# Patient Record
Sex: MALE | Race: WHITE | NOT HISPANIC OR LATINO | URBAN - METROPOLITAN AREA
[De-identification: names, ages, dates, MRNs, and addresses within clinical notes are randomized per-mention and may not be internally consistent; named-entity substitution may affect disease eponyms.]

---

## 2017-05-06 ENCOUNTER — OUTPATIENT (OUTPATIENT)
Dept: OUTPATIENT SERVICES | Facility: HOSPITAL | Age: 47
LOS: 1 days | End: 2017-05-06

## 2017-08-12 ENCOUNTER — OUTPATIENT (OUTPATIENT)
Dept: OUTPATIENT SERVICES | Facility: HOSPITAL | Age: 47
LOS: 1 days | End: 2017-08-12

## 2017-09-09 ENCOUNTER — OUTPATIENT (OUTPATIENT)
Dept: OUTPATIENT SERVICES | Facility: HOSPITAL | Age: 47
LOS: 1 days | End: 2017-09-09

## 2017-10-05 PROBLEM — Z00.00 ENCOUNTER FOR PREVENTIVE HEALTH EXAMINATION: Status: ACTIVE | Noted: 2017-10-05

## 2017-10-23 ENCOUNTER — APPOINTMENT (OUTPATIENT)
Dept: OPHTHALMOLOGY | Facility: CLINIC | Age: 47
End: 2017-10-23
Payer: COMMERCIAL

## 2017-10-23 DIAGNOSIS — H25.13 AGE-RELATED NUCLEAR CATARACT, BILATERAL: ICD-10-CM

## 2017-10-23 PROCEDURE — 92004 COMPRE OPH EXAM NEW PT 1/>: CPT

## 2019-07-28 ENCOUNTER — INPATIENT (INPATIENT)
Facility: HOSPITAL | Age: 49
LOS: 0 days | Discharge: HOME | DRG: 312 | End: 2019-07-29
Attending: HOSPITALIST | Admitting: HOSPITALIST
Payer: COMMERCIAL

## 2019-07-28 VITALS
OXYGEN SATURATION: 98 % | HEART RATE: 73 BPM | RESPIRATION RATE: 22 BRPM | WEIGHT: 160.06 LBS | SYSTOLIC BLOOD PRESSURE: 178 MMHG | TEMPERATURE: 98 F | HEIGHT: 66 IN | DIASTOLIC BLOOD PRESSURE: 60 MMHG

## 2019-07-28 DIAGNOSIS — R55 SYNCOPE AND COLLAPSE: ICD-10-CM

## 2019-07-28 LAB
ALBUMIN SERPL ELPH-MCNC: 4.3 G/DL — SIGNIFICANT CHANGE UP (ref 3.3–5.2)
ALP SERPL-CCNC: 60 U/L — SIGNIFICANT CHANGE UP (ref 40–120)
ALT FLD-CCNC: 8 U/L — SIGNIFICANT CHANGE UP
ANION GAP SERPL CALC-SCNC: 10 MMOL/L — SIGNIFICANT CHANGE UP (ref 5–17)
APTT BLD: 24.9 SEC — LOW (ref 27.5–36.3)
AST SERPL-CCNC: 19 U/L — SIGNIFICANT CHANGE UP
BILIRUB SERPL-MCNC: 0.4 MG/DL — SIGNIFICANT CHANGE UP (ref 0.4–2)
BLD GP AB SCN SERPL QL: SIGNIFICANT CHANGE UP
BUN SERPL-MCNC: 14 MG/DL — SIGNIFICANT CHANGE UP (ref 8–20)
CALCIUM SERPL-MCNC: 8.4 MG/DL — LOW (ref 8.6–10.2)
CHLORIDE SERPL-SCNC: 108 MMOL/L — HIGH (ref 98–107)
CO2 SERPL-SCNC: 23 MMOL/L — SIGNIFICANT CHANGE UP (ref 22–29)
CREAT SERPL-MCNC: 0.74 MG/DL — SIGNIFICANT CHANGE UP (ref 0.5–1.3)
GLUCOSE BLDC GLUCOMTR-MCNC: 158 MG/DL — HIGH (ref 70–99)
GLUCOSE BLDC GLUCOMTR-MCNC: 167 MG/DL — HIGH (ref 70–99)
GLUCOSE BLDC GLUCOMTR-MCNC: 190 MG/DL — HIGH (ref 70–99)
GLUCOSE SERPL-MCNC: 177 MG/DL — HIGH (ref 70–115)
HCT VFR BLD CALC: 46.1 % — SIGNIFICANT CHANGE UP (ref 39–50)
HGB BLD-MCNC: 15.7 G/DL — SIGNIFICANT CHANGE UP (ref 13–17)
INR BLD: 0.91 RATIO — SIGNIFICANT CHANGE UP (ref 0.88–1.16)
MCHC RBC-ENTMCNC: 31.8 PG — SIGNIFICANT CHANGE UP (ref 27–34)
MCHC RBC-ENTMCNC: 34.1 GM/DL — SIGNIFICANT CHANGE UP (ref 32–36)
MCV RBC AUTO: 93.5 FL — SIGNIFICANT CHANGE UP (ref 80–100)
PLATELET # BLD AUTO: 216 K/UL — SIGNIFICANT CHANGE UP (ref 150–400)
POTASSIUM SERPL-MCNC: 4.4 MMOL/L — SIGNIFICANT CHANGE UP (ref 3.5–5.3)
POTASSIUM SERPL-SCNC: 4.4 MMOL/L — SIGNIFICANT CHANGE UP (ref 3.5–5.3)
PROT SERPL-MCNC: 6.1 G/DL — LOW (ref 6.6–8.7)
PROTHROM AB SERPL-ACNC: 10.4 SEC — SIGNIFICANT CHANGE UP (ref 10–12.9)
RBC # BLD: 4.93 M/UL — SIGNIFICANT CHANGE UP (ref 4.2–5.8)
RBC # FLD: 12.5 % — SIGNIFICANT CHANGE UP (ref 10.3–14.5)
SODIUM SERPL-SCNC: 141 MMOL/L — SIGNIFICANT CHANGE UP (ref 135–145)
TROPONIN T SERPL-MCNC: <0.01 NG/ML — SIGNIFICANT CHANGE UP (ref 0–0.06)
TSH SERPL-MCNC: 1.06 UIU/ML — SIGNIFICANT CHANGE UP (ref 0.27–4.2)
WBC # BLD: 9.2 K/UL — SIGNIFICANT CHANGE UP (ref 3.8–10.5)
WBC # FLD AUTO: 9.2 K/UL — SIGNIFICANT CHANGE UP (ref 3.8–10.5)

## 2019-07-28 PROCEDURE — 99285 EMERGENCY DEPT VISIT HI MDM: CPT

## 2019-07-28 PROCEDURE — 99223 1ST HOSP IP/OBS HIGH 75: CPT

## 2019-07-28 PROCEDURE — 93880 EXTRACRANIAL BILAT STUDY: CPT | Mod: 26

## 2019-07-28 PROCEDURE — 93010 ELECTROCARDIOGRAM REPORT: CPT

## 2019-07-28 PROCEDURE — 70450 CT HEAD/BRAIN W/O DYE: CPT | Mod: 26

## 2019-07-28 PROCEDURE — 71046 X-RAY EXAM CHEST 2 VIEWS: CPT | Mod: 26

## 2019-07-28 PROCEDURE — 99222 1ST HOSP IP/OBS MODERATE 55: CPT

## 2019-07-28 RX ORDER — DEXTROSE 50 % IN WATER 50 %
25 SYRINGE (ML) INTRAVENOUS ONCE
Refills: 0 | Status: DISCONTINUED | OUTPATIENT
Start: 2019-07-28 | End: 2019-07-29

## 2019-07-28 RX ORDER — GLUCAGON INJECTION, SOLUTION 0.5 MG/.1ML
1 INJECTION, SOLUTION SUBCUTANEOUS ONCE
Refills: 0 | Status: DISCONTINUED | OUTPATIENT
Start: 2019-07-28 | End: 2019-07-29

## 2019-07-28 RX ORDER — LISINOPRIL 2.5 MG/1
20 TABLET ORAL DAILY
Refills: 0 | Status: DISCONTINUED | OUTPATIENT
Start: 2019-07-28 | End: 2019-07-29

## 2019-07-28 RX ORDER — DEXTROSE 50 % IN WATER 50 %
15 SYRINGE (ML) INTRAVENOUS ONCE
Refills: 0 | Status: DISCONTINUED | OUTPATIENT
Start: 2019-07-28 | End: 2019-07-29

## 2019-07-28 RX ORDER — SODIUM CHLORIDE 9 MG/ML
1000 INJECTION INTRAMUSCULAR; INTRAVENOUS; SUBCUTANEOUS ONCE
Refills: 0 | Status: COMPLETED | OUTPATIENT
Start: 2019-07-28 | End: 2019-07-28

## 2019-07-28 RX ORDER — LISINOPRIL 2.5 MG/1
2.5 TABLET ORAL DAILY
Refills: 0 | Status: DISCONTINUED | OUTPATIENT
Start: 2019-07-28 | End: 2019-07-28

## 2019-07-28 RX ORDER — INSULIN LISPRO 100/ML
VIAL (ML) SUBCUTANEOUS
Refills: 0 | Status: DISCONTINUED | OUTPATIENT
Start: 2019-07-28 | End: 2019-07-29

## 2019-07-28 RX ORDER — ENOXAPARIN SODIUM 100 MG/ML
40 INJECTION SUBCUTANEOUS DAILY
Refills: 0 | Status: DISCONTINUED | OUTPATIENT
Start: 2019-07-28 | End: 2019-07-29

## 2019-07-28 RX ORDER — SODIUM CHLORIDE 9 MG/ML
1000 INJECTION, SOLUTION INTRAVENOUS
Refills: 0 | Status: DISCONTINUED | OUTPATIENT
Start: 2019-07-28 | End: 2019-07-29

## 2019-07-28 RX ORDER — HYDRALAZINE HCL 50 MG
10 TABLET ORAL EVERY 6 HOURS
Refills: 0 | Status: DISCONTINUED | OUTPATIENT
Start: 2019-07-28 | End: 2019-07-29

## 2019-07-28 RX ORDER — DEXTROSE 50 % IN WATER 50 %
12.5 SYRINGE (ML) INTRAVENOUS ONCE
Refills: 0 | Status: DISCONTINUED | OUTPATIENT
Start: 2019-07-28 | End: 2019-07-29

## 2019-07-28 RX ORDER — ATORVASTATIN CALCIUM 80 MG/1
20 TABLET, FILM COATED ORAL AT BEDTIME
Refills: 0 | Status: DISCONTINUED | OUTPATIENT
Start: 2019-07-28 | End: 2019-07-29

## 2019-07-28 RX ORDER — METFORMIN HYDROCHLORIDE 850 MG/1
1 TABLET ORAL
Qty: 0 | Refills: 0 | DISCHARGE

## 2019-07-28 RX ORDER — ACETAMINOPHEN 500 MG
650 TABLET ORAL EVERY 6 HOURS
Refills: 0 | Status: DISCONTINUED | OUTPATIENT
Start: 2019-07-28 | End: 2019-07-29

## 2019-07-28 RX ADMIN — Medication 2: at 16:35

## 2019-07-28 RX ADMIN — SODIUM CHLORIDE 1000 MILLILITER(S): 9 INJECTION INTRAMUSCULAR; INTRAVENOUS; SUBCUTANEOUS at 07:45

## 2019-07-28 RX ADMIN — LISINOPRIL 20 MILLIGRAM(S): 2.5 TABLET ORAL at 16:36

## 2019-07-28 RX ADMIN — SODIUM CHLORIDE 1000 MILLILITER(S): 9 INJECTION INTRAMUSCULAR; INTRAVENOUS; SUBCUTANEOUS at 08:46

## 2019-07-28 RX ADMIN — ENOXAPARIN SODIUM 40 MILLIGRAM(S): 100 INJECTION SUBCUTANEOUS at 10:57

## 2019-07-28 RX ADMIN — ATORVASTATIN CALCIUM 20 MILLIGRAM(S): 80 TABLET, FILM COATED ORAL at 22:22

## 2019-07-28 RX ADMIN — Medication 2: at 10:57

## 2019-07-28 NOTE — ED ADULT NURSE NOTE - NSIMPLEMENTINTERV_GEN_ALL_ED
Implemented All Fall Risk Interventions:  [place] to call system. Call bell, personal items and telephone within reach. Instruct patient to call for assistance. Room bathroom lighting operational. Non-slip footwear when patient is off stretcher. Physically safe environment: no spills, clutter or unnecessary equipment. Stretcher in lowest position, wheels locked, appropriate side rails in place. Provide visual cue, wrist band, yellow gown, etc. Monitor gait and stability. Monitor for mental status changes and reorient to person, place, and time. Review medications for side effects contributing to fall risk. Reinforce activity limits and safety measures with patient and family.

## 2019-07-28 NOTE — H&P ADULT - NSHPREVIEWOFSYSTEMS_GEN_ALL_CORE
REVIEW OF SYSTEMS:    CONSTITUTIONAL: No fever, weight loss, or fatigue  EYES: No eye pain, visual disturbances, or discharge  ENMT:  No difficulty hearing, tinnitus, vertigo; No sinus or throat pain  NECK: No pain or stiffness  BREASTS: No pain, masses, or nipple discharge  RESPIRATORY: No cough, wheezing, chills or hemoptysis; No shortness of breath  CARDIOVASCULAR: syncope   GASTROINTESTINAL: No abdominal or epigastric pain. No nausea, vomiting, or hematemesis; No diarrhea or constipation. No melena or hematochezia.  GENITOURINARY: No dysuria, frequency, hematuria, or incontinence  NEUROLOGICAL: No headaches, memory loss, loss of strength, numbness, or tremors  SKIN: No itching, burning, rashes, or lesions   LYMPH NODES: No enlarged glands  ENDOCRINE: No heat or cold intolerance; No hair loss  MUSCULOSKELETAL: No joint pain or swelling; No muscle, back, or extremity pain  PSYCHIATRIC: No depression, anxiety, mood swings, or difficulty sleeping  HEME/LYMPH: No easy bruising, or bleeding gums  ALLERY AND IMMUNOLOGIC: No hives or eczema

## 2019-07-28 NOTE — ED PROVIDER NOTE - OBJECTIVE STATEMENT
48M h/o HTN, DM presents s/p syncopal episode this morning. Was walking to car felt lightheaded and then woke up on the ground. Laceration to occiput. Denies CP, papitations, leg pain, leg swelling, travel, SOB, fever, incontinence, post ictal. Had recent tetanus vaccine.

## 2019-07-28 NOTE — ED ADULT NURSE REASSESSMENT NOTE - NS ED NURSE REASSESS COMMENT FT1
Assumed pt care at 0740.  pt awake, alert and oriented x3 offering no complaints at this time.  minimal bleeding coming from back of head.  denies chest pain or shortness of breath.  denies headache or dizziness.  awaiting CT.  pt aware of plan of care.  family at bedside.  pt on cardiac monitor.  Will continue to monitor and reassess.

## 2019-07-28 NOTE — H&P ADULT - HISTORY OF PRESENT ILLNESS
48 yom with pmh of htn and hld presents from with girlfriend with an episode of syncope that was witnessed. Patient states he was in his usual state of health and was walking to the car with his girlfriend and the next thing he noticed was that he was on the ground. Patients girlfreind provided some of the history. Patient denies prior chest pain or palpitations. Patient denies any recent illnesses or any reasons for dehydration sucj as vomiting or diarrhea. PAtient is being admitted for syncope work up. Patient denies any family history of cad or early death.

## 2019-07-28 NOTE — CONSULT NOTE ADULT - ASSESSMENT
A/P:  49yo male with PMH HTN, HLD, NIDMII presents to the ED s/p syncopal episode this morning.  Patient states he was walking to the car this morning began feeling lightheaded and next thing he knew he woke up on the ground with laceration to the occipital are of head (stapled in ED).  Patient reports a remote hx of syncope at one time after being at the beach which he attributed to dehydration.  He had a nml stress test 5yrs ago at a cardiologist near [facility].  Currently asymptomatic. A/P:  49yo male with PMH HTN, HLD, NIDMII presents to the ED s/p syncopal episode this morning.  Patient states he was walking to the car this morning began feeling lightheaded and next thing he knew he woke up on the ground with laceration to the occipital are of head (stapled in ED).  Patient reports a remote hx of syncope at one time after being at the beach which he attributed to dehydration.  He had a nml stress test 5yrs ago at a cardiologist near [facility].  Currently asymptomatic.      1. Syncope  - Tele, Bigeminy  - EKG, no ischemia  - Trop neg x1  - serial troponin  - TSH  - TTE pending  - Carotid u/s pending  - NPO after MN  - Exercise nuclear ST tomorrow    2. HTN  - c/w Lisinopril

## 2019-07-28 NOTE — CONSULT NOTE ADULT - SUBJECTIVE AND OBJECTIVE BOX
[place] [facility]                                                        Office: [address] [address]                                                       Telephone: [phone]. Fax:[phone]                                                              CARDIOLOGY CONSULTATION NOTE                                                                                             Consult requested by:  Dr. Worthy    PCP Dr Castillo, [facility]    Primary Cardiologist. none    Reason for Consultation: syncope    History obtained by: Patient and medical record     obtained: No    Chief complaint:    Patient is a 48y old  Male who presents with a chief complaint of syncope    HPI:  49yo male with PMH HTN, HLD, NIDMII presents to the ED s/p syncopal episode this morning.  Patient states he was walking to the car this morning began feeling lightheaded and next thing he knew he woke up on the ground with laceration to the occipital are of head (stapled in ED).  Patient reports a remote hx of syncope at one time after being at the beach which he attributed to dehydration.  He had a nml stress test 5yrs ago at a cardiologist near [facility].  Currently asymptomatic. Denies chest pain/pressure, palpitations, irregular and/or rapid heart beat, SOB, GUZMAN, orthopnea, PND, cough, edema, n/v/d, hematuria, or hematochezia.       ALLERGIES: Allergies    No Known Allergies    Intolerances          CURRENT MEDICATIONS:  MEDICATIONS  (STANDING):  dextrose 5%. 1000 milliLiter(s) (50 mL/Hr) IV Continuous <Continuous>  dextrose 50% Injectable 12.5 Gram(s) IV Push once  dextrose 50% Injectable 25 Gram(s) IV Push once  dextrose 50% Injectable 25 Gram(s) IV Push once  enoxaparin Injectable 40 milliGRAM(s) SubCutaneous daily  insulin lispro (HumaLOG) corrective regimen sliding scale   SubCutaneous three times a day before meals      HOME MEDICATIONS:  Home Medications:  atorvastatin 20 mg oral tablet: 1 tab(s) orally once a day (28 Jul 2019 10:16)  lisinopril 2.5 mg oral tablet: 1 tab(s) orally once a day (28 Jul 2019 10:16)  metFORMIN 500 mg oral tablet: 1 tab(s) orally 2 times a day (28 Jul 2019 10:16)    PAST MEDICAL HISTORY  DM (diabetes mellitus)  HTN (hypertension)      PAST SURGICAL HISTORY      FAMILY HISTORY:  Denies    SOCIAL HISTORY:       CIGARETTES:   denies    ALCOHOL social    DRUG ABUSE denies      REVIEW OF SYSTEMS:  CONSTITUTIONAL:  as per HPI  HEENT:  Eyes:  No diplopia or blurred vision. ENT:  No earache, sore throat or runny nose.  CARDIOVASCULAR:  as per HPI  RESPIRATORY:  No cough, shortness of breath, PND or orthopnea.  GASTROINTESTINAL:  No nausea, vomiting or diarrhea.  GENITOURINARY:  No dysuria, frequency or urgency. No Blood in urine  MUSCULOSKELETAL:  no joint aches, no muscle pain  SKIN:  No change in skin, hair or nails.  NEUROLOGIC:  No paresthesias, fasciculations, seizures or weakness.  PSYCHIATRIC:  No disorder of thought or mood.  ENDOCRINE:  No heat or cold intolerance, polyuria or polydipsia.  HEMATOLOGICAL:  No easy bruising or bleeding.           	        Vital Signs Last 24 Hrs  T(C): 36.7 (07-28-19 @ 09:56), Max: 36.8 (07-28-19 @ 06:37)  T(F): 98 (07-28-19 @ 09:56), Max: 98.2 (07-28-19 @ 06:37)  HR: 83 (07-28-19 @ 09:56) (71 - 83)  BP: 179/84 (07-28-19 @ 09:56) (139/77 - 179/84)  BP(mean): --  RR: 18 (07-28-19 @ 09:56) (18 - 22)  SpO2: 96% (07-28-19 @ 09:56) (96% - 100%)    PHYSICAL EXAM:  Constitutional: Comfortable . No acute distress.   HEENT: Atraumatic and normocephalic , neck is supple . no JVD. Unremarkable  CNS: Alert and awake, Grossly nonfocal.  Lymph Nodes: Cervical : Not palpable.  Respiratory: Bilateral clear breath sounds.  Cardiovascular: Normal S1S2. . No murmur/rubs or gallop.  Gastrointestinal: Soft non-tender and non distended . +Bowel sounds.   Extremities: No leg edema.   Psychiatric: Calm . no agitation.  Skin: No skin rash.    Intake and output:     LABS:                        15.7   9.20  )-----------( 216      ( 28 Jul 2019 08:16 )             46.1     07-28    141  |  108<H>  |  14.0  ----------------------------<  177<H>  4.4   |  23.0  |  0.74    Ca    8.4<L>      28 Jul 2019 09:27    TPro  6.1<L>  /  Alb  4.3  /  TBili  0.4  /  DBili  x   /  AST  19  /  ALT  8   /  AlkPhos  60  07-28    CARDIAC MARKERS ( 28 Jul 2019 09:27 )  x     / <0.01 ng/mL / x     / x     / x        ;p-BNP=  PT/INR - ( 28 Jul 2019 08:15 )   PT: 10.4 sec;   INR: 0.91 ratio         PTT - ( 28 Jul 2019 08:15 )  PTT:24.9 sec    LIVER FUNCTIONS - ( 28 Jul 2019 09:27 )  Alb: 4.3 g/dL / Pro: 6.1 g/dL / ALK PHOS: 60 U/L / ALT: 8 U/L / AST: 19 U/L / GGT: x           INTERPRETATION OF TELEMETRY: Reviewed by me.   ECG: Reviewed by me.     RADIOLOGY & ADDITIONAL STUDIES/ECHO/CARDIAC CATH: [facility]                                                        Office: [address] [address]                                                       Telephone: [phone]. Fax:[phone]                                                              CARDIOLOGY CONSULTATION NOTE                                                                                             Consult requested by:  Dr. Worthy    PCP Dr Castillo, [facility]    Primary Cardiologist. none    Reason for Consultation: syncope    History obtained by: Patient and medical record     obtained: No    Chief complaint:    Patient is a 48y old  Male who presents with a chief complaint of syncope    HPI:  47yo male with PMH HTN, HLD, NIDMII presents to the ED s/p syncopal episode this morning.  Patient states he was walking to the car this morning began feeling lightheaded and next thing he knew he woke up on the ground with laceration to the occipital are of head (stapled in ED).  Patient reports a remote hx of syncope at one time after being at the beach which he attributed to dehydration.  He had a nml stress test 5yrs ago at a cardiologist near [facility].  Currently asymptomatic. Denies chest pain/pressure, palpitations, irregular and/or rapid heart beat, SOB, GUZMAN, orthopnea, PND, cough, edema, n/v/d, hematuria, or hematochezia.       ALLERGIES: Allergies    No Known Allergies    Intolerances          CURRENT MEDICATIONS:  MEDICATIONS  (STANDING):  dextrose 5%. 1000 milliLiter(s) (50 mL/Hr) IV Continuous <Continuous>  dextrose 50% Injectable 12.5 Gram(s) IV Push once  dextrose 50% Injectable 25 Gram(s) IV Push once  dextrose 50% Injectable 25 Gram(s) IV Push once  enoxaparin Injectable 40 milliGRAM(s) SubCutaneous daily  insulin lispro (HumaLOG) corrective regimen sliding scale   SubCutaneous three times a day before meals      HOME MEDICATIONS:  Home Medications:  atorvastatin 20 mg oral tablet: 1 tab(s) orally once a day (28 Jul 2019 10:16)  lisinopril 2.5 mg oral tablet: 1 tab(s) orally once a day (28 Jul 2019 10:16)  metFORMIN 500 mg oral tablet: 1 tab(s) orally 2 times a day (28 Jul 2019 10:16)    PAST MEDICAL HISTORY  DM (diabetes mellitus)  HTN (hypertension)      PAST SURGICAL HISTORY      FAMILY HISTORY:  Denies    SOCIAL HISTORY:       CIGARETTES:   denies    ALCOHOL social    DRUG ABUSE denies      REVIEW OF SYSTEMS:  CONSTITUTIONAL:  as per HPI  HEENT:  Eyes:  No diplopia or blurred vision. ENT:  No earache, sore throat or runny nose.  CARDIOVASCULAR:  as per HPI  RESPIRATORY:  No cough, shortness of breath, PND or orthopnea.  GASTROINTESTINAL:  No nausea, vomiting or diarrhea.  GENITOURINARY:  No dysuria, frequency or urgency. No Blood in urine  MUSCULOSKELETAL:  no joint aches, no muscle pain  SKIN:  No change in skin, hair or nails.  NEUROLOGIC:  No paresthesias, fasciculations, seizures or weakness.  PSYCHIATRIC:  No disorder of thought or mood.  ENDOCRINE:  No heat or cold intolerance, polyuria or polydipsia.  HEMATOLOGICAL:  No easy bruising or bleeding.           	        Vital Signs Last 24 Hrs  T(C): 36.7 (07-28-19 @ 09:56), Max: 36.8 (07-28-19 @ 06:37)  T(F): 98 (07-28-19 @ 09:56), Max: 98.2 (07-28-19 @ 06:37)  HR: 83 (07-28-19 @ 09:56) (71 - 83)  BP: 179/84 (07-28-19 @ 09:56) (139/77 - 179/84)  BP(mean): --  RR: 18 (07-28-19 @ 09:56) (18 - 22)  SpO2: 96% (07-28-19 @ 09:56) (96% - 100%)    PHYSICAL EXAM:  Constitutional: Comfortable . No acute distress.   HEENT: Atraumatic and normocephalic , neck is supple . no JVD. Unremarkable  CNS: Alert and awake, Grossly nonfocal.  Lymph Nodes: Cervical : Not palpable.  Respiratory: Bilateral clear breath sounds.  Cardiovascular: Normal S1S2. . No murmur/rubs or gallop.  Gastrointestinal: Soft non-tender and non distended . +Bowel sounds.   Extremities: No leg edema.   Psychiatric: Calm . no agitation.  Skin: No skin rash.    Intake and output:     LABS:                        15.7   9.20  )-----------( 216      ( 28 Jul 2019 08:16 )             46.1     07-28    141  |  108<H>  |  14.0  ----------------------------<  177<H>  4.4   |  23.0  |  0.74    Ca    8.4<L>      28 Jul 2019 09:27    TPro  6.1<L>  /  Alb  4.3  /  TBili  0.4  /  DBili  x   /  AST  19  /  ALT  8   /  AlkPhos  60  07-28    CARDIAC MARKERS ( 28 Jul 2019 09:27 )  x     / <0.01 ng/mL / x     / x     / x        ;p-BNP=  PT/INR - ( 28 Jul 2019 08:15 )   PT: 10.4 sec;   INR: 0.91 ratio         PTT - ( 28 Jul 2019 08:15 )  PTT:24.9 sec    LIVER FUNCTIONS - ( 28 Jul 2019 09:27 )  Alb: 4.3 g/dL / Pro: 6.1 g/dL / ALK PHOS: 60 U/L / ALT: 8 U/L / AST: 19 U/L / GGT: x           INTERPRETATION OF TELEMETRY: Bigeminy 70s  ECG: SR with frequent PVCs, 70bpm    RADIOLOGY & ADDITIONAL STUDIES/ECHO/CARDIAC CATH:    < from: Xray Chest 2 Views PA/Lat (07.28.19 @ 08:16) >   EXAM:  XR CHEST PA LAT 2V                          PROCEDURE DATE:  07/28/2019          INTERPRETATION:  CLINICAL INFORMATION: Syncope.    PA and lateral chest x-rays were performed.     Comparison:  None.    The mediastinal cardiac silhouette is normal.     The lungs are clear.     The osseous structures and soft tissues are unremarkable.    IMPRESSION:    Normal chest.      < end of copied text >    < from: CT Head No Cont (07.28.19 @ 08:20) >  IMPRESSION:     No evidence of acute intracranial hemorrhage, midline shift or CT   evidence of acute territorial infarct.    If the patient's symptoms persist, consider short interval follow-up head   CT or brain MRI if there are no MRI contraindications.    < end of copied text >

## 2019-07-28 NOTE — ED PROVIDER NOTE - CARE PLAN
Principal Discharge DX:	Syncope and collapse  Secondary Diagnosis:	Head trauma, initial encounter Principal Discharge DX:	Syncope and collapse  Secondary Diagnosis:	Head trauma, initial encounter  Secondary Diagnosis:	Scalp laceration, initial encounter

## 2019-07-28 NOTE — ED PROVIDER NOTE - CLINICAL SUMMARY MEDICAL DECISION MAKING FREE TEXT BOX
48M presenting s/p syncopal episode, found to have ECG changes (bigeminy, frequent PVCs). Plan for labs, TSH, trop, CXR, CTH, laceration repair. Cardiology consult, admission.

## 2019-07-28 NOTE — H&P ADULT - NSHPLABSRESULTS_GEN_ALL_CORE
15.7   9.20   )----------(  216       ( 28 Jul 2019 08:16 )               46.1      141    |  108    |  14.0   ----------------------------<  177        ( 28 Jul 2019 09:27 )  4.4     |  23.0   |  0.74     Ca    8.4        ( 28 Jul 2019 09:27 )    TPro  6.1    /  Alb  4.3    /  TBili  0.4    /  DBili  x      /  AST  19     /  ALT  8      /  AlkPhos  60     ( 28 Jul 2019 09:27 )    LIVER FUNCTIONS - ( 28 Jul 2019 09:27 )  Alb: 4.3 g/dL / Pro: 6.1 g/dL / ALK PHOS: 60 U/L / ALT: 8 U/L / AST: 19 U/L / GGT: x           PT/INR -  10.4 sec / 0.91 ratio   ( 28 Jul 2019 08:15 )       PTT -  24.9 sec   ( 28 Jul 2019 08:15 )  CAPILLARY BLOOD GLUCOSE    CARDIAC MARKERS ( 28 Jul 2019 09:27 )  x     / <0.01 ng/mL / x     / x     / x            tte - ordered    carotids - ordered

## 2019-07-28 NOTE — ED ADULT TRIAGE NOTE - CHIEF COMPLAINT QUOTE
woke up around 5pm feeling dizzy, collapsed and passed out while walking to car, lac to back of head, denies any pain at this time

## 2019-07-28 NOTE — ED ADULT NURSE REASSESSMENT NOTE - INTERVENTIONS DEFINITIONS
Non-slip footwear when patient is off stretcher/[place] to call system/Stretcher in lowest position, wheels locked, appropriate side rails in place/Call bell, personal items and telephone within reach/Instruct patient to call for assistance/Monitor gait and stability

## 2019-07-28 NOTE — H&P ADULT - ASSESSMENT
1) syncope - cardio consult appreciated  --> npo for stress in am  --> f.u tte and carotids    2) dm2 - hold oral meds  --> ssi    3) htn - home meds    4) hld - home meds

## 2019-07-28 NOTE — ED ADULT NURSE REASSESSMENT NOTE - NS ED NURSE REASSESS COMMENT FT1
Pt remains awake, alert and oriented x3 with no signs of acute distress.  Pt denies chest pain or shortness of breath.  Respirations even and unlabored.  Will continue to monitor and reassess.

## 2019-07-28 NOTE — ED ADULT NURSE NOTE - OBJECTIVE STATEMENT
PT presents to ED s/p syncopal episode. PT states he woke up around 5am and didn't feel right. C/O lightheadedness and nausea prior to syncope. PT state symptoms resolved at this time. No chest pain SOB or lightheadedness at this time. PT has lack to occuput. bleeding controlled at this time

## 2019-07-29 ENCOUNTER — TRANSCRIPTION ENCOUNTER (OUTPATIENT)
Age: 49
End: 2019-07-29

## 2019-07-29 VITALS
DIASTOLIC BLOOD PRESSURE: 76 MMHG | SYSTOLIC BLOOD PRESSURE: 131 MMHG | HEART RATE: 55 BPM | TEMPERATURE: 98 F | OXYGEN SATURATION: 99 % | RESPIRATION RATE: 16 BRPM

## 2019-07-29 LAB
ALBUMIN SERPL ELPH-MCNC: 4.3 G/DL — SIGNIFICANT CHANGE UP (ref 3.3–5.2)
ALP SERPL-CCNC: 44 U/L — SIGNIFICANT CHANGE UP (ref 40–120)
ALT FLD-CCNC: 8 U/L — SIGNIFICANT CHANGE UP
ANION GAP SERPL CALC-SCNC: 11 MMOL/L — SIGNIFICANT CHANGE UP (ref 5–17)
AST SERPL-CCNC: 15 U/L — SIGNIFICANT CHANGE UP
BILIRUB SERPL-MCNC: 1 MG/DL — SIGNIFICANT CHANGE UP (ref 0.4–2)
BUN SERPL-MCNC: 12 MG/DL — SIGNIFICANT CHANGE UP (ref 8–20)
CALCIUM SERPL-MCNC: 9 MG/DL — SIGNIFICANT CHANGE UP (ref 8.6–10.2)
CHLORIDE SERPL-SCNC: 105 MMOL/L — SIGNIFICANT CHANGE UP (ref 98–107)
CO2 SERPL-SCNC: 26 MMOL/L — SIGNIFICANT CHANGE UP (ref 22–29)
CREAT SERPL-MCNC: 0.66 MG/DL — SIGNIFICANT CHANGE UP (ref 0.5–1.3)
GLUCOSE BLDC GLUCOMTR-MCNC: 171 MG/DL — HIGH (ref 70–99)
GLUCOSE BLDC GLUCOMTR-MCNC: 212 MG/DL — HIGH (ref 70–99)
GLUCOSE SERPL-MCNC: 162 MG/DL — HIGH (ref 70–115)
HBA1C BLD-MCNC: 6.4 % — HIGH (ref 4–5.6)
HCT VFR BLD CALC: 44.5 % — SIGNIFICANT CHANGE UP (ref 39–50)
HGB BLD-MCNC: 15.2 G/DL — SIGNIFICANT CHANGE UP (ref 13–17)
MAGNESIUM SERPL-MCNC: 1.9 MG/DL — SIGNIFICANT CHANGE UP (ref 1.6–2.6)
MCHC RBC-ENTMCNC: 31.4 PG — SIGNIFICANT CHANGE UP (ref 27–34)
MCHC RBC-ENTMCNC: 34.2 GM/DL — SIGNIFICANT CHANGE UP (ref 32–36)
MCV RBC AUTO: 91.9 FL — SIGNIFICANT CHANGE UP (ref 80–100)
PLATELET # BLD AUTO: 194 K/UL — SIGNIFICANT CHANGE UP (ref 150–400)
POTASSIUM SERPL-MCNC: 3.8 MMOL/L — SIGNIFICANT CHANGE UP (ref 3.5–5.3)
POTASSIUM SERPL-SCNC: 3.8 MMOL/L — SIGNIFICANT CHANGE UP (ref 3.5–5.3)
PROT SERPL-MCNC: 6.6 G/DL — SIGNIFICANT CHANGE UP (ref 6.6–8.7)
RBC # BLD: 4.84 M/UL — SIGNIFICANT CHANGE UP (ref 4.2–5.8)
RBC # FLD: 12.1 % — SIGNIFICANT CHANGE UP (ref 10.3–14.5)
SODIUM SERPL-SCNC: 142 MMOL/L — SIGNIFICANT CHANGE UP (ref 135–145)
TROPONIN T SERPL-MCNC: <0.01 NG/ML — SIGNIFICANT CHANGE UP (ref 0–0.06)
TSH SERPL-MCNC: 0.42 UIU/ML — SIGNIFICANT CHANGE UP (ref 0.27–4.2)
WBC # BLD: 9.58 K/UL — SIGNIFICANT CHANGE UP (ref 3.8–10.5)
WBC # FLD AUTO: 9.58 K/UL — SIGNIFICANT CHANGE UP (ref 3.8–10.5)

## 2019-07-29 PROCEDURE — 85730 THROMBOPLASTIN TIME PARTIAL: CPT

## 2019-07-29 PROCEDURE — 70450 CT HEAD/BRAIN W/O DYE: CPT

## 2019-07-29 PROCEDURE — 93018 CV STRESS TEST I&R ONLY: CPT

## 2019-07-29 PROCEDURE — 93306 TTE W/DOPPLER COMPLETE: CPT

## 2019-07-29 PROCEDURE — 93016 CV STRESS TEST SUPVJ ONLY: CPT

## 2019-07-29 PROCEDURE — 83735 ASSAY OF MAGNESIUM: CPT

## 2019-07-29 PROCEDURE — 80053 COMPREHEN METABOLIC PANEL: CPT

## 2019-07-29 PROCEDURE — 86900 BLOOD TYPING SEROLOGIC ABO: CPT

## 2019-07-29 PROCEDURE — 93306 TTE W/DOPPLER COMPLETE: CPT | Mod: 26

## 2019-07-29 PROCEDURE — 93017 CV STRESS TEST TRACING ONLY: CPT

## 2019-07-29 PROCEDURE — 71046 X-RAY EXAM CHEST 2 VIEWS: CPT

## 2019-07-29 PROCEDURE — 78452 HT MUSCLE IMAGE SPECT MULT: CPT | Mod: 26

## 2019-07-29 PROCEDURE — 78452 HT MUSCLE IMAGE SPECT MULT: CPT

## 2019-07-29 PROCEDURE — 36415 COLL VENOUS BLD VENIPUNCTURE: CPT

## 2019-07-29 PROCEDURE — 93880 EXTRACRANIAL BILAT STUDY: CPT

## 2019-07-29 PROCEDURE — 93005 ELECTROCARDIOGRAM TRACING: CPT

## 2019-07-29 PROCEDURE — 85610 PROTHROMBIN TIME: CPT

## 2019-07-29 PROCEDURE — 99239 HOSP IP/OBS DSCHRG MGMT >30: CPT

## 2019-07-29 PROCEDURE — A9500: CPT

## 2019-07-29 PROCEDURE — 96360 HYDRATION IV INFUSION INIT: CPT

## 2019-07-29 PROCEDURE — 82962 GLUCOSE BLOOD TEST: CPT

## 2019-07-29 PROCEDURE — 84484 ASSAY OF TROPONIN QUANT: CPT

## 2019-07-29 PROCEDURE — 83036 HEMOGLOBIN GLYCOSYLATED A1C: CPT

## 2019-07-29 PROCEDURE — 86901 BLOOD TYPING SEROLOGIC RH(D): CPT

## 2019-07-29 PROCEDURE — 84443 ASSAY THYROID STIM HORMONE: CPT

## 2019-07-29 PROCEDURE — 85027 COMPLETE CBC AUTOMATED: CPT

## 2019-07-29 PROCEDURE — 86850 RBC ANTIBODY SCREEN: CPT

## 2019-07-29 PROCEDURE — 99285 EMERGENCY DEPT VISIT HI MDM: CPT | Mod: 25

## 2019-07-29 PROCEDURE — 99232 SBSQ HOSP IP/OBS MODERATE 35: CPT

## 2019-07-29 RX ORDER — LISINOPRIL 2.5 MG/1
1 TABLET ORAL
Qty: 30 | Refills: 0
Start: 2019-07-29 | End: 2019-08-27

## 2019-07-29 RX ORDER — ATORVASTATIN CALCIUM 80 MG/1
1 TABLET, FILM COATED ORAL
Qty: 0 | Refills: 0 | DISCHARGE

## 2019-07-29 RX ORDER — ATORVASTATIN CALCIUM 80 MG/1
1 TABLET, FILM COATED ORAL
Qty: 0 | Refills: 0 | DISCHARGE
Start: 2019-07-29

## 2019-07-29 RX ORDER — LISINOPRIL 2.5 MG/1
1 TABLET ORAL
Qty: 0 | Refills: 0 | DISCHARGE

## 2019-07-29 RX ADMIN — LISINOPRIL 20 MILLIGRAM(S): 2.5 TABLET ORAL at 05:24

## 2019-07-29 RX ADMIN — Medication 2: at 08:10

## 2019-07-29 RX ADMIN — Medication 4: at 17:08

## 2019-07-29 NOTE — PROGRESS NOTE ADULT - SUBJECTIVE AND OBJECTIVE BOX
[place] [facility]                                                        Office: [address] [address]                                                       Telephone: [phone]. Fax:[phone]                                                                             PROGRESS NOTE    Subjective:  Patient is doing well, no complaints, seen in the stress lab     Review of symptoms:   Cardiac:  No chest pain. No dyspnea. No palpitations.  Respiratory:no cough. No dyspnea  Gastrointestinal: No diarrhea. No abdominal pain. No bleeding.   Neuro: No focal neuro complaints.      	  Vitals:  T(C): 36.4 (07-29-19 @ 15:33), Max: 37.3 (07-29-19 @ 07:20)  HR: 63 (07-29-19 @ 15:33) (57 - 71)  BP: 131/70 (07-29-19 @ 15:33) (122/80 - 137/67)  RR: 18 (07-29-19 @ 15:33) (18 - 19)  SpO2: 99% (07-29-19 @ 15:33) (96% - 100%)  Wt(kg): --  I&O's Summary    Weight (kg): 72.6 (07-28 @ 06:37)    PHYSICAL EXAM:  Appearance: Comfortable. No acute distress  HEENT:  Head and neck: Atraumatic. Normocephalic. , Neck is supple. No JVD,   Neurologic: Alert and awake, Grossly nonfocal.   Lymphatic: No cervical lymphadenopathy  Cardiovascular: Normal S1 S2, No murmurs. No JVD,   Respiratory: Lungs clear to auscultation  Gastrointestinal:  Soft, Non-tender, + BS  Lower Extremities: No edema  Psychiatry: Patient is calm. No agitation.  Skin: No rashes.    CURRENT MEDICATIONS:    MEDICATIONS  (STANDING):  atorvastatin 20 milliGRAM(s) Oral at bedtime  dextrose 5%. 1000 milliLiter(s) (50 mL/Hr) IV Continuous <Continuous>  dextrose 50% Injectable 12.5 Gram(s) IV Push once  dextrose 50% Injectable 25 Gram(s) IV Push once  dextrose 50% Injectable 25 Gram(s) IV Push once  enoxaparin Injectable 40 milliGRAM(s) SubCutaneous daily  insulin lispro (HumaLOG) corrective regimen sliding scale   SubCutaneous three times a day before meals  lisinopril 20 milliGRAM(s) Oral daily      LABS:	 	                          15.2   9.58  )-----------( 194      ( 29 Jul 2019 06:18 )             44.5     07-29    142  |  105  |  12.0  ----------------------------<  162<H>  3.8   |  26.0  |  0.66    Ca    9.0      29 Jul 2019 06:18  Mg     1.9     07-29    TPro  6.6  /  Alb  4.3  /  TBili  1.0  /  DBili  x   /  AST  15  /  ALT  8   /  AlkPhos  44  07-29    proBNP:   Lipid Profile:   CARDIAC MARKERS ( 29 Jul 2019 06:18 )  x     / <0.01 ng/mL / x     / x     / x      CARDIAC MARKERS ( 28 Jul 2019 09:27 )  x     / <0.01 ng/mL / x     / x     / x          LIVER FUNCTIONS - ( 29 Jul 2019 06:18 )  Alb: 4.3 g/dL / Pro: 6.6 g/dL / ALK PHOS: 44 U/L / ALT: 8 U/L / AST: 15 U/L / GGT: x             TELEMETRY: Reviewed  - No significant arrhythmias    ECG:

## 2019-07-29 NOTE — DISCHARGE NOTE PROVIDER - HOSPITAL COURSE
48 yom with pmh of htn and hld presents from with girlfriend with an episode of syncope that was witnessed. Patient states he was in his usual state of health and was walking to the car with his girlfriend and the next thing he noticed was that he was on the ground.         patient admitted to medicine and seen by cardio in consult. patient had a stress and if negative will be discharged home in good health.             time spent on dc 32 minutrs

## 2019-07-29 NOTE — DISCHARGE NOTE PROVIDER - CARE PROVIDER_API CALL
Marcus Wilde)  Cardiology; Cardiovascular Disease; Internal Medicine  [address] [place] [address]  Phone: [phone]  Fax: [phone]  Follow Up Time:     Brent Castillo)  Neurology  [address] [place] [address]  Phone: [phone]  Fax: [phone]  Follow Up Time:

## 2019-07-29 NOTE — PROGRESS NOTE ADULT - ASSESSMENT
A/P:  49yo male with PMH HTN, HLD, NIDMII presents to the ED s/p syncopal episode this morning.  Patient states he was walking to the car this morning began feeling lightheaded and next thing he knew he woke up on the ground with laceration to the occipital are of head (stapled in ED).  Patient reports a remote hx of syncope at [place] which he attributed to dehydration.  He had a nml stress test 5yrs ago [place] cardiologist [facility].  Currently asymptomatic.    Patients trop negative, normal echo and normal stress test.    1. Syncope- [place] etiology  - Outpatient event monitor  - F/u with me in the office      2. HTN  - c/w Lisinopril      Patient is OK for d/c from cardiology perspective.

## 2019-07-29 NOTE — DISCHARGE NOTE PROVIDER - NSDCCPCAREPLAN_GEN_ALL_CORE_FT
PRINCIPAL DISCHARGE DIAGNOSIS  Diagnosis: Syncope and collapse  Assessment and Plan of Treatment:       SECONDARY DISCHARGE DIAGNOSES  Diagnosis: Scalp laceration, initial encounter  Assessment and Plan of Treatment:     Diagnosis: Head trauma, initial encounter  Assessment and Plan of Treatment:

## 2019-07-29 NOTE — DISCHARGE NOTE NURSING/CASE MANAGEMENT/SOCIAL WORK - NSDCDPATPORTLINK_GEN_ALL_CORE
You can access the [facility] Patient Portal, offered by [facility], by registering with the following website: [email or web address]

## 2019-07-30 ENCOUNTER — TRANSCRIPTION ENCOUNTER (OUTPATIENT)
Age: 49
End: 2019-07-30

## 2019-10-01 PROBLEM — I10 ESSENTIAL (PRIMARY) HYPERTENSION: Chronic | Status: ACTIVE | Noted: 2019-07-28

## 2019-10-01 PROBLEM — E11.9 TYPE 2 DIABETES MELLITUS WITHOUT COMPLICATIONS: Chronic | Status: ACTIVE | Noted: 2019-07-28

## 2019-10-08 ENCOUNTER — NON-APPOINTMENT (OUTPATIENT)
Age: 49
End: 2019-10-08

## 2019-10-08 ENCOUNTER — APPOINTMENT (OUTPATIENT)
Dept: CARDIOLOGY | Facility: CLINIC | Age: 49
End: 2019-10-08
Payer: COMMERCIAL

## 2019-10-08 VITALS
SYSTOLIC BLOOD PRESSURE: 123 MMHG | BODY MASS INDEX: 25.23 KG/M2 | OXYGEN SATURATION: 100 % | HEIGHT: 66 IN | HEART RATE: 71 BPM | DIASTOLIC BLOOD PRESSURE: 81 MMHG | WEIGHT: 157 LBS

## 2019-10-08 DIAGNOSIS — R00.2 PALPITATIONS: ICD-10-CM

## 2019-10-08 DIAGNOSIS — E11.9 TYPE 2 DIABETES MELLITUS W/OUT COMPLICATIONS: ICD-10-CM

## 2019-10-08 DIAGNOSIS — Z78.9 OTHER SPECIFIED HEALTH STATUS: ICD-10-CM

## 2019-10-08 DIAGNOSIS — R55 SYNCOPE AND COLLAPSE: ICD-10-CM

## 2019-10-08 DIAGNOSIS — Z87.898 PERSONAL HISTORY OF OTHER SPECIFIED CONDITIONS: ICD-10-CM

## 2019-10-08 DIAGNOSIS — Z83.3 FAMILY HISTORY OF DIABETES MELLITUS: ICD-10-CM

## 2019-10-08 DIAGNOSIS — E78.5 HYPERLIPIDEMIA, UNSPECIFIED: ICD-10-CM

## 2019-10-08 DIAGNOSIS — Z82.3 FAMILY HISTORY OF STROKE: ICD-10-CM

## 2019-10-08 DIAGNOSIS — I10 ESSENTIAL (PRIMARY) HYPERTENSION: ICD-10-CM

## 2019-10-08 DIAGNOSIS — Z60.2 PROBLEMS RELATED TO LIVING ALONE: ICD-10-CM

## 2019-10-08 PROCEDURE — 99214 OFFICE O/P EST MOD 30 MIN: CPT | Mod: 25

## 2019-10-08 PROCEDURE — 93000 ELECTROCARDIOGRAM COMPLETE: CPT

## 2019-10-08 RX ORDER — METFORMIN ER 500 MG 500 MG/1
500 TABLET ORAL
Refills: 0 | Status: ACTIVE | COMMUNITY
Start: 2019-10-08

## 2019-10-08 RX ORDER — LISINOPRIL 2.5 MG/1
2.5 TABLET ORAL DAILY
Qty: 90 | Refills: 0 | Status: ACTIVE | COMMUNITY
Start: 2019-10-08

## 2019-10-08 SDOH — SOCIAL STABILITY - SOCIAL INSECURITY: PROBLEMS RELATED TO LIVING ALONE: Z60.2

## 2019-10-08 NOTE — PHYSICAL EXAM
[General Appearance - Well Developed] : well developed [Normal Appearance] : normal appearance [General Appearance - Well Nourished] : well nourished [Well Groomed] : well groomed [General Appearance - In No Acute Distress] : no acute distress [No Deformities] : no deformities [Normal Conjunctiva] : the conjunctiva exhibited no abnormalities [No Oral Pallor] : no oral pallor [Normal Jugular Venous V Waves Present] : normal jugular venous V waves present [Auscultation Breath Sounds / Voice Sounds] : lungs were clear to auscultation bilaterally [Respiration, Rhythm And Depth] : normal respiratory rhythm and effort [Heart Rate And Rhythm] : heart rate and rhythm were normal [Heart Sounds] : normal S1 and S2 [Murmurs] : no murmurs present [Bowel Sounds] : normal bowel sounds [Edema] : no peripheral edema present [Abdomen Soft] : soft [Abdomen Tenderness] : non-tender [Cyanosis, Localized] : no localized cyanosis [Nail Clubbing] : no clubbing of the fingernails [Abnormal Walk] : normal gait [] : no rash [Skin Color & Pigmentation] : normal skin color and pigmentation [Oriented To Time, Place, And Person] : oriented to person, place, and time

## 2019-10-08 NOTE — REASON FOR VISIT
[Follow-Up - From Hospitalization] : follow-up of a recent hospitalization for [Syncope] : syncope [Discharge Date: ___] : Discharge Date: [unfilled]

## 2019-10-10 NOTE — HISTORY OF PRESENT ILLNESS
[FreeTextEntry1] : 48 year old male with PMH of HTN, HLD, and DM presents for hospital follow up. He was admitted to [facility] in July 2019 for syncope. He was walking to the car felt lightheaded, woke up on the ground, sustained  an occipital laceration. He had a normal nuclear stress test, echocardiogram, CXR and no hemodynamically significant stenosis on carotid US. Tesha negative. CT scan brain wnl. He denies any further syncopal episodes, near syncope, CP, SOB, or activity intolerance. He does have rare palpitations, ~ 1x/month if he drinks too much coffee. He drinks 1-2 cups of coffee/day.

## 2019-10-10 NOTE — DISCUSSION/SUMMARY
[FreeTextEntry1] : 48 year old male with PMH of HTN, HLD, and DM presents for hospital follow up. He was admitted to [facility] in July 2019 for syncope. He was walking to the car felt lightheaded, woke up on the ground, sustained  an occipital laceration. He had a normal nuclear stress test, echocardiogram, CXR and no hemodynamically significant stenosis on carotid US. Tesha negative. CT scan brain wnl. He denies any further syncopal episodes, near syncope, CP, SOB, or activity intolerance. He does have rare palpitations, ~ 1x/month if he drinks too much coffee. He drinks 1-2 cups of coffee/day.  \par \par Plan\par 1. syncope/rare palpitations- normal NST, carotids, Tesha, and echo, occasional PVCs on NST and bigeminy on EKG in [facility] ED, 30 day MCOT to evaluate for significant arrhythmias and PVC burden\par 2. HTN- BP at goal, continue lisinopril, follows with PMD\par 3. DM- stable, follows with PMD\par 4. follow up in 8 weeks with Dr. Wilde\par \par AMINATA Sommer

## 2019-10-10 NOTE — REVIEW OF SYSTEMS
[Negative] : Heme/Lymph [see HPI] : see HPI [Palpitations] : palpitations [Shortness Of Breath] : no shortness of breath [Dyspnea on exertion] : not dyspnea during exertion [Chest Pain] : no chest pain [Lower Ext Edema] : no extremity edema

## 2019-12-04 RX ORDER — ASPIRIN ENTERIC COATED TABLETS 81 MG 81 MG/1
81 TABLET, DELAYED RELEASE ORAL DAILY
Qty: 90 | Refills: 2 | Status: ACTIVE | COMMUNITY
Start: 2019-10-08

## 2019-12-06 ENCOUNTER — APPOINTMENT (OUTPATIENT)
Dept: CARDIOLOGY | Facility: CLINIC | Age: 49
End: 2019-12-06

## 2021-03-11 NOTE — DISCHARGE NOTE NURSING/CASE MANAGEMENT/SOCIAL WORK - NSDCPEFALRISK_GEN_ALL_CORE
Patient information on fall and injury prevention

## 2024-01-13 NOTE — ED PROCEDURE NOTE - CPROC ED POST PROC CARE GUIDE1
[facility] [place]  Nurses Note -- 4 Eyes      1/13/2024       Skin assessed on: Q Shift      [x] No Pressure Injuries Present    [x]Prevention Measures Documented    [] Yes LDA  for Pressure Injury Previously documented     [] Yes New Pressure Injury Discovered   [] LDA for New Pressure Injury Added      Attending RN:  America NAILS RN     Second RN:  KAMILAH Grullon       Verbal/written post procedure instructions were given to patient/caregiver./Instructed patient/caregiver regarding signs and symptoms of infection./Instructed patient/caregiver to follow-up with primary care physician./Keep the cast/splint/dressing clean and dry.